# Patient Record
Sex: MALE | Race: WHITE
[De-identification: names, ages, dates, MRNs, and addresses within clinical notes are randomized per-mention and may not be internally consistent; named-entity substitution may affect disease eponyms.]

---

## 2019-12-13 ENCOUNTER — HOSPITAL ENCOUNTER (OUTPATIENT)
Dept: HOSPITAL 75 - RAD | Age: 24
End: 2019-12-13
Attending: NURSE PRACTITIONER
Payer: SELF-PAY

## 2019-12-13 DIAGNOSIS — I86.1: Primary | ICD-10-CM

## 2019-12-13 PROCEDURE — 76870 US EXAM SCROTUM: CPT

## 2019-12-13 NOTE — DIAGNOSTIC IMAGING REPORT
PROCEDURE: US Scrotum.



TECHNIQUE: Multiple real-time grayscale images were obtained over

the scrotum in various projections bilaterally.



INDICATION: Scrotal pain



There is normal blood flow seen on color Doppler images of both

testicles. No testicular mass identified. There is no evidence of

extratesticular lesion apart from small left varicocele. There is

no hydrocele. No hyperemia is identified.



IMPRESSION: Small left varicocele without evidence of scrotal

mass or focal inflammation.



Dictated by: 



  Dictated on workstation # [id number]